# Patient Record
Sex: FEMALE | Race: WHITE | URBAN - NONMETROPOLITAN AREA
[De-identification: names, ages, dates, MRNs, and addresses within clinical notes are randomized per-mention and may not be internally consistent; named-entity substitution may affect disease eponyms.]

---

## 2017-03-16 NOTE — PATIENT DISCUSSION
PROLIFERATIVE DIABETIC RETINOPATHY:  I have talked with the patient about the nature of proliferative diabetic retinopathy and the significant risk of visual complications if not treated appropriately. For this reason regular follow-up with an ophthalmologist is essential.  I have also advised aggressive blood sugar and blood pressure control to minimize the risk of further damage.

## 2017-03-16 NOTE — PATIENT DISCUSSION
PROLIFERATIVE DIABETIC RETINOPATHY- ADVISED PT IMPORTANCE TO KEEP BS UNDER CONTROL, ANY CHANGES IN [place] CALL FOR AN APPT

## 2017-03-16 NOTE — PATIENT DISCUSSION
DERMATOCHALASIS OU: The condition is not visually significant at this time. I have discussed with the patient the progression of droopy lids will worsen over time, and repair may be necessary in the future. The patient will call the office if this worsens or any changes in vision occur.

## 2017-03-28 NOTE — PATIENT DISCUSSION
Proliferative Diabetic Retinopathy Counseling: The patient was informed that ongoing control of blood glucose, blood pressure and lipid levels is necessary to minimize future retinal damage due to diabetes.

## 2017-03-28 NOTE — PATIENT DISCUSSION
NONPROLIFERATIVE DIABETIC RETINOPATHY OD:  NO CSDME OD.  RETURN FOR FOLLOW-UP AS SCHEDULED FOR DILATED EYE EXAM.

## 2017-03-28 NOTE — PATIENT DISCUSSION
CHOROIDAL NEVUS, OS: PRESCRIBED UV PROTECTION TO MINIMIZE UV EXPOSURE. RETURN AS SCHEDULED FOR EVALUATION AND PHOTO DOCUMENTATION.

## 2017-04-04 NOTE — PATIENT DISCUSSION
CATARACTS, OU - VISUALLY SIGNIFICANT. SCHEDULE OS FIRST THEN LATER IN OD IF VISUAL SYMPTOMS PERSIST.

## 2017-04-27 NOTE — PATIENT DISCUSSION
Continue: Ocuflox (ofloxacin): drops: 0.3% 1 drop four times a day as directed into affected eye 04-

## 2017-04-27 NOTE — PATIENT DISCUSSION
Continue: Pred Forte (prednisolone acetate): drops,suspension: 1% 1 drop four times a day as directed into affected eye 04-

## 2017-05-16 NOTE — PATIENT DISCUSSION
2 WEEK POST-OP/PRE-OP-Continue to taper off of Pred-forte as directed. The patient is ready to schedule cataract surgery for the second eye.

## 2017-06-22 NOTE — PATIENT DISCUSSION
2 WEEK POST-OP- All looks good. Continue to taper off of Pred-forte as directed. Rx was given for glasses and/or readers recommended.

## 2017-11-07 NOTE — PATIENT DISCUSSION
PROLIFERATIVE DIABETIC RETINOPATHY, OS:  RETINA ATTACHED S/P PPVPRP FOR TRD REPAIR. STABLE. GUARDED VISUAL PROGNOSIS.

## 2018-04-06 NOTE — PATIENT DISCUSSION
Letter has been written for patient to request CDL  renewal with all finding from testing that has been preformed .

## 2018-05-08 NOTE — PATIENT DISCUSSION
RETINA IS ATTACHED OU: PVD OU; S/P PPV OS;  NO HOLES OR TEARS SEEN ON DILATED EXAM TODAY.  RETINAL DETACHMENT SIGNS AND SYMPTOMS REVIEWED

## 2018-05-08 NOTE — PATIENT DISCUSSION
PROLIFERATIVE DIABETIC RETINOPATHY, OS: STABLE. MACULAR ISCHEMIA.  RETURN FOR FOLLOW-UP AS SCHEDULED FOR DILATED EYE EXAM.

## 2018-05-08 NOTE — PATIENT DISCUSSION
NONPROLIFERATIVE DIABETIC RETINOPATHY, OD:  STABLE.  RETURN FOR FOLLOW-UP AS SCHEDULED FOR DILATED EYE EXAM.

## 2018-11-18 NOTE — PATIENT DISCUSSION
General:
Proliferative Diabetic Retinopathy Counseling:  I have discussed with the patient the importance of controlling blood glucose, blood pressure and lipid levels  is necessary to minimize future retinal damage due to diabetes. Return for follow-up as scheduled.
vision greatly improved OS.  pt still however has significant diabetic eye disease that needs to be monitored and treated in retina.   pt already has upcoming appt with dr Rama Villalpando
170.18

## 2019-11-25 ENCOUNTER — IMPORTED ENCOUNTER (OUTPATIENT)
Dept: URBAN - NONMETROPOLITAN AREA CLINIC 1 | Facility: CLINIC | Age: 69
End: 2019-11-25

## 2019-11-25 PROBLEM — Z96.1: Noted: 2019-11-25

## 2019-11-25 PROBLEM — H16.223: Noted: 2019-11-25

## 2019-11-25 PROBLEM — H43.812: Noted: 2019-11-25

## 2019-11-25 PROBLEM — H26.493: Noted: 2019-11-25

## 2019-11-25 PROBLEM — H11.32: Noted: 2019-11-25

## 2019-11-25 PROBLEM — H40.023: Noted: 2019-11-25

## 2019-11-25 PROCEDURE — 99213 OFFICE O/P EST LOW 20 MIN: CPT

## 2019-11-25 NOTE — PATIENT DISCUSSION
Subconjunctival Hemorrhage OS - Discussed diagnosis in detail with patient - Recommend cool compresses 10-15 mins on and 45 mins off - Recommend Refresh or Systane Complete through out the day - Patient states that she has been diagnosed with Glaucoma and is currently on drops - Would like to get previous records - Continue to monitor- RTC N/A for BL GL with VF OCT Gonio  -----------------------------------previous notes---------------------------PVD OS:  - Discussed findings of exam in detail with the patient. - The risk of retinal detachment in patients with PVDs was discussed with the patient and the warning signs of retinal detachment were carefully reviewed with the patient. - The patient was warned to return to the office or contact the ophthalmologist on call immediately if they experience signs of retinal detachment or changes in vision noted from today. - Optos done previously shows PVD OS no signs of retinal holes tears or detachments - Continue to monitor. JOSEF OU - Discussed diagnosis in detail with patient- Discussed signs and symptoms associated including blurred vision- Eyes look much better today OU with the help of the [address] [place] [address]- Start Restasis OU BID- Continue to monitor PRNGlaucoma Suspect- Discussed diagnosis in detail with patient - Patient states she sees Dr. Martha Schofield in [place] for her glaucoma and she did not agree with his plan of care. He had recommended she start Latanoprost OU QHS and she never started it did not feel like she had the diagnosis. - Family hx of glaucoma - Cup to Disc ratio noted at 0.65 OD and 0.7 OS - IOP noted OD 16 and OS 17- Need to obtain updated OCT and [facility] next visit. - VF 30-2 obtained at last visit: reliable OD with scattered defects unreliable OS with superior nasal step. - Continue to monitor PRNPseudophakia OU with PCO OU- Discussed diagnosis in detail with patient- Cataract surgery done about 6 months ago done by Dr. Martha Schofield in [place]- [address] noted but stable and no treatment needed at this time - Continue to monitor; Dr's Notes: MRDFE  5/10/17Optos 9/10/18OCTGonioVF 5/10/17PACH

## 2019-12-26 ENCOUNTER — IMPORTED ENCOUNTER (OUTPATIENT)
Dept: URBAN - NONMETROPOLITAN AREA CLINIC 1 | Facility: CLINIC | Age: 69
End: 2019-12-26

## 2019-12-26 PROBLEM — H43.812: Noted: 2019-12-26

## 2019-12-26 PROBLEM — H16.223: Noted: 2019-12-26

## 2019-12-26 PROBLEM — Z96.1: Noted: 2019-12-26

## 2019-12-26 PROBLEM — H26.493: Noted: 2019-12-26

## 2019-12-26 PROBLEM — H40.1231: Noted: 2019-12-26

## 2019-12-26 PROCEDURE — 92020 GONIOSCOPY: CPT

## 2019-12-26 PROCEDURE — 92083 EXTENDED VISUAL FIELD XM: CPT

## 2019-12-26 PROCEDURE — 99214 OFFICE O/P EST MOD 30 MIN: CPT

## 2019-12-26 PROCEDURE — 92133 CPTRZD OPH DX IMG PST SGM ON: CPT

## 2019-12-26 NOTE — PATIENT DISCUSSION
LTG OU Mild - Discussed diagnosis in detail with patient - Patient states she sees Dr. Chichi Olguin in [place] for her glaucoma and she did not agree with his plan of care. He had recommended she start Latanoprost OU QHS and she never started it did not feel like she had the diagnosis. - Family hx of glaucoma - Cup to Disc ratio noted at 0.65 OD and 0.7 OS - IOP noted at 11 OU- OCT done today OD shows No NFL thinning and OS shows Inferior NFL thinning and Borderline Superior NFL thinning - VF done today OD shows good field with Borderline Central defect and OS shows Good field with Superior Nasal Defect and OS - Gonio done today Grade IV with moderate pigment OU - Start Latanoprost QHS OU Rx sent to pharmacy - Continue to monitor- RTC 4 months F/U with Optos ------------------------------previous notes------------------------------PVD OS:  - Discussed findings of exam in detail with the patient. - The risk of retinal detachment in patients with PVDs was discussed with the patient and the warning signs of retinal detachment were carefully reviewed with the patient. - The patient was warned to return to the office or contact the ophthalmologist on call immediately if they experience signs of retinal detachment or changes in vision noted from today. - Optos done previously shows PVD OS no signs of retinal holes tears or detachments - Continue to monitor.  JOSEF OU - Discussed diagnosis in detail with patient- Discussed signs and symptoms associated including blurred vision- Eyes look much better today OU with the help of the [address] [place] [address]- Start Restasis OU BID- Continue to monitor PRNPseudophakia OU with PCO OU- Discussed diagnosis in detail with patient- Cataract surgery done about 6 months ago done by Dr. Chichi Olguin in [address] [place] [address] Pkwy noted but stable and no treatment needed at this time - Continue to monitor; Dr's Notes: MRDFE  5/10/17Optos 9/10/18OCTGonioVF 5/10/17PACH

## 2021-08-04 ENCOUNTER — IMPORTED ENCOUNTER (OUTPATIENT)
Dept: URBAN - NONMETROPOLITAN AREA CLINIC 1 | Facility: CLINIC | Age: 71
End: 2021-08-04

## 2021-08-04 PROBLEM — Z96.1: Noted: 2021-08-04

## 2021-08-04 PROBLEM — H16.223: Noted: 2021-12-03

## 2021-08-04 PROBLEM — H26.493: Noted: 2021-08-04

## 2021-08-04 PROBLEM — H43.812: Noted: 2021-08-04

## 2021-08-04 PROBLEM — H40.1231: Noted: 2021-12-03

## 2021-08-04 PROBLEM — H40.1231: Noted: 2021-08-04

## 2021-08-04 PROBLEM — H16.223: Noted: 2021-08-04

## 2021-08-04 PROBLEM — H52.4: Noted: 2021-08-04

## 2021-08-04 PROBLEM — H26.493: Noted: 2021-12-03

## 2021-08-04 PROBLEM — H43.812: Noted: 2021-12-03

## 2021-08-04 PROBLEM — H52.4: Noted: 2021-12-03

## 2021-08-04 PROCEDURE — 92014 COMPRE OPH EXAM EST PT 1/>: CPT

## 2021-08-04 PROCEDURE — 92015 DETERMINE REFRACTIVE STATE: CPT

## 2021-08-04 NOTE — PATIENT DISCUSSION
Presbyopia OU - Discussed diagnosis in detail with patient - MR re-checked today by Dr. Cintia Tidwell and new glasses RX given - Continue to monitor - RTC 3-4 months F/U LTG with VF OCT LTG OU Mild - Discussed diagnosis in detail with patient - Patient states she sees Dr. Ann Garcia in [place] for her glaucoma and she did not agree with his plan of care. He had recommended she start Latanoprost OU QHS and she never started it did not feel like she had the diagnosis. - Family hx of glaucoma - IOP noted at 12 OU- Cup to Disc ratio noted at 0.65 OD and 0.7 OS - OCT done previously OD shows No NFL thinning and OS shows Inferior NFL thinning and Borderline Superior NFL thinning - VF done previously OD shows good field with Borderline Central defect and OS shows Good field with Superior Nasal Defect and OS - Gonio done previously Grade IV with moderate pigment OU - Continue Latanoprost QHS OU- Continue to monitorPVD OS:  - Discussed findings of exam in detail with the patient. - The risk of retinal detachment in patients with PVDs was discussed with the patient and the warning signs of retinal detachment were carefully reviewed with the patient. - The patient was warned to return to the office or contact the ophthalmologist on call immediately if they experience signs of retinal detachment or changes in vision noted from today. - Optos done previously shows PVD OS no signs of retinal holes tears or detachments - Continue to monitor. JOSEF OU - Discussed diagnosis in detail with patient- Discussed signs and symptoms associated including blurred vision- Recommend drinking plenty of water and taking Omega 3's- Eyes look much better today OU with the help of the FML- Continue to monitor Pseudophakia OU with PCO OU- Discussed diagnosis in detail with patient- Cataract surgery done about 6 months ago done by Dr. Ann Garcia in [address] noted but stable and no treatment needed at this time - PER Dr. Uriel Gonzalez notes patient has had YAG PC OU done - Also patient states that Dr. Ana Rosa Sanchez only did cataract sugery done in the OD and she states that Dr. Christelle Farah did the surgery in the OS. We have no record of doing cataract surgery in the OS.  - Continue to monitor; Dr's Notes: MRDFE  5/10/17Optos 9/10/18OCTGonioVF 5/10/17LYUBOV

## 2021-12-03 ENCOUNTER — PREPPED CHART (OUTPATIENT)
Dept: URBAN - NONMETROPOLITAN AREA CLINIC 1 | Facility: CLINIC | Age: 71
End: 2021-12-03

## 2021-12-03 ENCOUNTER — IMPORTED ENCOUNTER (OUTPATIENT)
Dept: URBAN - NONMETROPOLITAN AREA CLINIC 1 | Facility: CLINIC | Age: 71
End: 2021-12-03

## 2021-12-03 PROCEDURE — 99213 OFFICE O/P EST LOW 20 MIN: CPT

## 2021-12-03 PROCEDURE — 92083 EXTENDED VISUAL FIELD XM: CPT

## 2021-12-03 PROCEDURE — 92133 CPTRZD OPH DX IMG PST SGM ON: CPT

## 2021-12-03 NOTE — PATIENT DISCUSSION
Presbyopia OU - Discussed diagnosis in detail with patient - Continue to monitor LTG OU Mild - Discussed diagnosis in detail with patient - Patient states she sees Dr. Angle Gunter in [place] for her glaucoma and she did not agree with his plan of care. He had recommended she start Latanoprost OU QHS and she never started it did not feel like she had the diagnosis. - Family hx of glaucoma - IOP noted at 13 OU- Cup to Disc ratio noted at 0.65 OD and 0.7 OS - OCT done today OD shows No NFL thinning and OS shows Inferior NFL thinning and Borderline Superior Temporal NFL thinning - VF done today OD shows Fair field and Normal and OS shows Good field with Superior Arcuate Defect  - Gonio done previously Grade IV with moderate pigment OU - Continue Latanoprost QHS OU- Continue to monitorPVD OS:  - Discussed findings of exam in detail with the patient. - The risk of retinal detachment in patients with PVDs was discussed with the patient and the warning signs of retinal detachment were carefully reviewed with the patient. - The patient was warned to return to the office or contact the ophthalmologist on call immediately if they experience signs of retinal detachment or changes in vision noted from today. - Optos done previously shows PVD OS no signs of retinal holes tears or detachments - Continue to monitor. JOSEF OU - Discussed diagnosis in detail with patient- Discussed signs and symptoms associated including blurred vision- Recommend drinking plenty of water and taking Omega 3's- Eyes look much better today OU with the help of the FML- Continue to monitor Pseudophakia OU with PCO OU- Discussed diagnosis in detail with patient- Cataract surgery done about 6 months ago done by Dr. Angle Gunter in [address] noted but stable and no treatment needed at this time - PER Dr. King Carrasco notes patient has had YAG PC OU done - Also patient states that Dr. Erin Rajput only did cataract sugery done in the OD and she states that Dr. Kamaljit Bueno did the surgery in the OS. We have no record of doing cataract surgery in the OS.  - Continue to monitor; Dr's Notes: MRDFE  5/10/17Optos 9/10/18OCT 12/3/21GonioVF 12/3/21PACH

## 2021-12-03 NOTE — PATIENT DISCUSSION
Patient reports that Dr. Yazmin Keller did CE OD, but Dr. Dominguez Blood did CE OS. However, we have no record of Dr. Dominguez Blood doing this procedure. Will need to clarify this.

## 2022-03-31 ASSESSMENT — TONOMETRY
OD_IOP_MMHG: 15
OS_IOP_MMHG: 15

## 2022-03-31 ASSESSMENT — VISUAL ACUITY
OS_CC: 20/30-
OD_CC: 20/25

## 2022-04-04 ENCOUNTER — FOLLOW UP (OUTPATIENT)
Dept: URBAN - NONMETROPOLITAN AREA CLINIC 1 | Facility: CLINIC | Age: 72
End: 2022-04-04

## 2022-04-04 DIAGNOSIS — H40.1231: ICD-10-CM

## 2022-04-04 PROCEDURE — 99213 OFFICE O/P EST LOW 20 MIN: CPT

## 2022-04-04 PROCEDURE — 92250 FUNDUS PHOTOGRAPHY W/I&R: CPT

## 2022-04-04 ASSESSMENT — VISUAL ACUITY
OS_CC: 20/20
OD_CC: 20/20-1

## 2022-04-04 NOTE — PATIENT DISCUSSION
Patient to continue with current gtt regimen. Condition was discussed with patient and patient understands. Will continue to monitor patient for any progression in condition. Patient was advised to call us with any problems, questions, or concerns.

## 2022-04-04 NOTE — PATIENT DISCUSSION
Patient reports that Dr. Ana Rosa Sanchez did CE OD, but Dr. Christelle Farah did CE OS. However, we have no record of Dr. Christelle Farah doing this procedure.

## 2022-04-09 ASSESSMENT — VISUAL ACUITY
OS_PH: 20/25
OD_SC: 20/25-2
OD_SC: 20/25-
OS_SC: 20/30
OS_SC: 20/30-
OS_SC: 20/30
OD_SC: 20/20-
OD_SC: 20/25
OS_SC: 20/40-2

## 2022-04-09 ASSESSMENT — TONOMETRY
OD_IOP_MMHG: 16
OS_IOP_MMHG: 12
OD_IOP_MMHG: 12
OD_IOP_MMHG: 15
OD_IOP_MMHG: 11
OS_IOP_MMHG: 16
OS_IOP_MMHG: 15
OS_IOP_MMHG: 11

## 2022-08-11 ENCOUNTER — FOLLOW UP (OUTPATIENT)
Dept: URBAN - NONMETROPOLITAN AREA CLINIC 1 | Facility: CLINIC | Age: 72
End: 2022-08-11

## 2022-08-11 DIAGNOSIS — H52.4: ICD-10-CM

## 2022-08-11 PROCEDURE — 92015 DETERMINE REFRACTIVE STATE: CPT

## 2022-08-11 PROCEDURE — 92014 COMPRE OPH EXAM EST PT 1/>: CPT

## 2022-08-11 RX ORDER — LATANOPROST 50 UG/ML: 1 SOLUTION/ DROPS OPHTHALMIC EVERY EVENING

## 2022-08-11 ASSESSMENT — VISUAL ACUITY
OD_CC: 20/20
OS_CC: 20/20

## 2022-08-11 ASSESSMENT — TONOMETRY
OS_IOP_MMHG: 16
OD_IOP_MMHG: 15

## 2022-08-11 NOTE — PATIENT DISCUSSION
Patient reports that Dr. Kaitlin Phoenix did CE OD, but Dr. Michael Castañeda did CE OS. However, we have no record of Dr. Michael Castañeda doing this procedure.

## 2023-07-17 ENCOUNTER — FOLLOW UP (OUTPATIENT)
Dept: URBAN - NONMETROPOLITAN AREA CLINIC 1 | Facility: CLINIC | Age: 73
End: 2023-07-17

## 2023-07-17 DIAGNOSIS — H40.1231: ICD-10-CM

## 2023-07-17 PROCEDURE — 92133 CPTRZD OPH DX IMG PST SGM ON: CPT

## 2023-07-17 PROCEDURE — 99213 OFFICE O/P EST LOW 20 MIN: CPT

## 2023-07-17 ASSESSMENT — TONOMETRY
OD_IOP_MMHG: 14
OS_IOP_MMHG: 14

## 2023-07-17 ASSESSMENT — VISUAL ACUITY
OD_CC: 20/20
OU_CC: 20/20
OS_CC: 20/20

## 2024-03-04 ENCOUNTER — FOLLOW UP (OUTPATIENT)
Dept: URBAN - NONMETROPOLITAN AREA CLINIC 1 | Facility: CLINIC | Age: 74
End: 2024-03-04

## 2024-03-04 DIAGNOSIS — H40.1231: ICD-10-CM

## 2024-03-04 PROCEDURE — 92250 FUNDUS PHOTOGRAPHY W/I&R: CPT

## 2024-03-04 PROCEDURE — 99213 OFFICE O/P EST LOW 20 MIN: CPT

## 2024-03-04 ASSESSMENT — VISUAL ACUITY
OS_CC: 20/20
OU_CC: 20/20
OD_CC: 20/25

## 2024-03-04 ASSESSMENT — TONOMETRY
OD_IOP_MMHG: 14
OS_IOP_MMHG: 14

## 2024-04-09 ENCOUNTER — ESTABLISHED PATIENT (OUTPATIENT)
Dept: URBAN - NONMETROPOLITAN AREA CLINIC 1 | Facility: CLINIC | Age: 74
End: 2024-04-09

## 2024-04-09 DIAGNOSIS — H52.4: ICD-10-CM

## 2024-04-09 PROCEDURE — 92015 DETERMINE REFRACTIVE STATE: CPT

## 2024-04-09 PROCEDURE — 92014 COMPRE OPH EXAM EST PT 1/>: CPT

## 2024-04-09 ASSESSMENT — TONOMETRY
OS_IOP_MMHG: 15
OD_IOP_MMHG: 15

## 2024-04-09 ASSESSMENT — VISUAL ACUITY
OD_CC: 20/20
OS_CC: 20/25

## 2024-07-01 ENCOUNTER — ESTABLISHED PATIENT (OUTPATIENT)
Dept: URBAN - NONMETROPOLITAN AREA CLINIC 1 | Facility: CLINIC | Age: 74
End: 2024-07-01

## 2024-07-01 DIAGNOSIS — H11.32: ICD-10-CM

## 2024-07-01 PROCEDURE — 99213 OFFICE O/P EST LOW 20 MIN: CPT

## 2024-07-01 ASSESSMENT — TONOMETRY
OD_IOP_MMHG: 16
OS_IOP_MMHG: 16

## 2024-07-01 ASSESSMENT — VISUAL ACUITY
OS_CC: 20/20
OU_CC: 20/20
OD_CC: 20/20

## 2024-11-20 ENCOUNTER — FOLLOW UP (OUTPATIENT)
Dept: URBAN - NONMETROPOLITAN AREA CLINIC 1 | Facility: CLINIC | Age: 74
End: 2024-11-20

## 2024-11-20 DIAGNOSIS — H43.812: ICD-10-CM

## 2024-11-20 DIAGNOSIS — H16.223: ICD-10-CM

## 2024-11-20 DIAGNOSIS — H40.1231: ICD-10-CM

## 2024-11-20 PROCEDURE — 99213 OFFICE O/P EST LOW 20 MIN: CPT

## 2024-11-20 PROCEDURE — 92133 CPTRZD OPH DX IMG PST SGM ON: CPT

## 2025-05-21 ENCOUNTER — COMPREHENSIVE EXAM (OUTPATIENT)
Age: 75
End: 2025-05-21

## 2025-05-21 DIAGNOSIS — H52.4: ICD-10-CM

## 2025-05-21 PROCEDURE — 92015 DETERMINE REFRACTIVE STATE: CPT

## 2025-05-21 PROCEDURE — 92014 COMPRE OPH EXAM EST PT 1/>: CPT
